# Patient Record
Sex: FEMALE | Race: WHITE | NOT HISPANIC OR LATINO | Employment: UNEMPLOYED | URBAN - METROPOLITAN AREA
[De-identification: names, ages, dates, MRNs, and addresses within clinical notes are randomized per-mention and may not be internally consistent; named-entity substitution may affect disease eponyms.]

---

## 2017-01-18 DIAGNOSIS — L70.0 ACNE VULGARIS: ICD-10-CM

## 2017-01-18 RX ORDER — MINOCYCLINE HYDROCHLORIDE 65 MG/1
1 TABLET, FILM COATED, EXTENDED RELEASE ORAL DAILY
Qty: 30 TABLET | Refills: 0 | Status: SHIPPED | OUTPATIENT
Start: 2017-01-18

## 2017-01-18 NOTE — TELEPHONE ENCOUNTER
Informed patient that Dr. Radford refilled x 1 month and does not intend to keep on systemic antibiotics more than four months.  Please ensure using contraception as this medication is not intended for use with pregnancy.

## 2017-01-18 NOTE — TELEPHONE ENCOUNTER
Pt last seen 11/4/2016 requesting refill on solodyn  Scheduled to come back 2/3/2017 please advise...    Acne vulgaris, moderate severe, jawline, scarring        - minocycline (SOLODYN) 65 mg Tb24; Take 1 tablet by mouth once daily. Dispense: 30 tablet; Refill: 2  - dapsone (ACZONE) 7.5 % GlwP; Apply 1 application topically once daily. Dispense: 90 g; Refill: 0  - Spironolactone 25mg DQ (nl renal function, discussed sources of potassium, avoid over consumption of banana, coconut water or salt free supplements),   -  BP wash daily in shower

## 2017-01-18 NOTE — TELEPHONE ENCOUNTER
solodyn refilled for one month. Do not intend to keep systemic antibiotics for longer than 4 months. Please call patient and ensure she is using contraception as this medication should not be used in pregnancy.

## 2017-02-03 ENCOUNTER — OFFICE VISIT (OUTPATIENT)
Dept: DERMATOLOGY | Facility: CLINIC | Age: 32
End: 2017-02-03
Payer: OTHER GOVERNMENT

## 2017-02-03 VITALS — WEIGHT: 135 LBS | BODY MASS INDEX: 19.99 KG/M2 | HEIGHT: 69 IN

## 2017-02-03 DIAGNOSIS — L70.0 ACNE VULGARIS: ICD-10-CM

## 2017-02-03 PROCEDURE — 99213 OFFICE O/P EST LOW 20 MIN: CPT | Mod: S$PBB,,, | Performed by: DERMATOLOGY

## 2017-02-03 PROCEDURE — 99212 OFFICE O/P EST SF 10 MIN: CPT | Mod: PBBFAC,PO | Performed by: DERMATOLOGY

## 2017-02-03 PROCEDURE — 99999 PR PBB SHADOW E&M-EST. PATIENT-LVL II: CPT | Mod: PBBFAC,,, | Performed by: DERMATOLOGY

## 2017-02-03 RX ORDER — TRETINOIN 0.25 MG/G
CREAM TOPICAL DAILY
COMMUNITY

## 2017-02-03 RX ORDER — SPIRONOLACTONE 50 MG/1
25 TABLET, FILM COATED ORAL DAILY
Qty: 30 TABLET | Refills: 2 | Status: SHIPPED | OUTPATIENT
Start: 2017-02-03 | End: 2018-02-03

## 2017-02-03 RX ORDER — SPIRONOLACTONE 50 MG/1
25 TABLET, FILM COATED ORAL DAILY
Qty: 30 TABLET | Refills: 2 | Status: SHIPPED | OUTPATIENT
Start: 2017-02-03 | End: 2017-02-03 | Stop reason: SDUPTHER

## 2017-02-03 NOTE — MR AVS SNAPSHOT
[facility]  [address]  [place] [address]  Phone: [phone]                  Lynne Hui   2/3/2017 9:30 AM   Office Visit    Description:  Female : 1985   Provider:  Kaitlin Radford MD   Department:  [facility]           Reason for Visit     Follow-up           Diagnoses this Visit        Comments    Acne vulgaris                To Do List           Future Appointments        Provider Department Dept Phone    2017 9:30 AM Kaitlin Radford MD [facility] [phone]      Goals (5 Years of Data)     None      Follow-Up and Disposition     Return in about 3 months (around 5/3/2017).    Follow-up and Disposition History       These Medications        Disp Refills Start End    tazarotene (TAZORAC) 0.05 % Crea cream 60 g 1 2/3/2017 2/3/2018    Apply topically every evening. - Topical    Pharmacy: [facility] - [address] Ph #: [phone]       spironolactone (ALDACTONE) 50 MG tablet 30 tablet 2 2/3/2017 2/3/2018    Take 0.5 tablets (25 mg total) by mouth once daily. - Oral    Pharmacy: [facility] - [address] Ph #: [phone]         [facility] On Call     [facility] On Call Nurse Care Line -  Assistance  Registered nurses in the [facility] On Call Center provide clinical advisement, health education, appointment booking, and other advisory services.  Call for this free service at [phone].             Medications           Message regarding Medications     Verify the changes and/or additions to your medication regime listed below are the same as discussed with your clinician today.  If any of these changes or additions are incorrect, please notify your healthcare provider.        START taking these NEW medications        Refills    tazarotene (TAZORAC) 0.05 % Crea cream 1    Sig: Apply topically every evening.    Class: Normal    Route: Topical     "       Verify that the below list of medications is an accurate representation of the medications you are currently taking.  If none reported, the list may be blank. If incorrect, please contact your healthcare provider. Carry this list with you in case of emergency.           Current Medications     tretinoin (RETIN-A) 0.025 % cream Apply topically once daily.    dapsone (ACZONE) 7.5 % GlwP Apply 1 application topically once daily.    minocycline (SOLODYN) 65 mg Tb24 Take 1 tablet by mouth once daily.    spironolactone (ALDACTONE) 50 MG tablet Take 0.5 tablets (25 mg total) by mouth once daily.    tazarotene (TAZORAC) 0.05 % Crea cream Apply topically every evening.           Clinical Reference Information           Your Vitals Were     Height Weight BMI          5' 9" (1.753 m) 61.2 kg (135 lb) 19.94 kg/m2        Allergies as of 2/3/2017     No Known Allergies      Immunizations Administered on Date of Encounter - 2/3/2017     None      Language Assistance Services     ATTENTION: Language assistance services are available, free of charge. Please call [phone].      ATENCIÓN: Si torey lomas, tiene a campos disposición servicios gratuitos de asistencia lingüística. Llame al [phone].     NAIMA Ý: N?u b?n nói Ti?ng Vi?t, có các d?ch v? h? tr? ngôn ng? mi?n phí dành cho b?n. G?i s? [phone].         [facility] complies with applicable Federal civil rights laws and does not discriminate on the basis of race, color, national origin, age, disability, or sex.        "

## 2017-02-03 NOTE — PROGRESS NOTES
Subjective:       Patient ID:  Lynne Hui is a 31 y.o. female who presents for   Chief Complaint   Patient presents with    Follow-up     acne     HPI Comments: Patient last seen 11/2016  Acne as a teen, treated with accutane, cleared skin, recurred in college, cleared on its own  During pregnancy, severe flare, improved between kids, then worsened again  Has been okay in reecnt past, moved from [place] one year ago and exploded on jawline, deep cysts  Tried dietary intervention and topicals    On murina, 2 children, no plan to conceive in near future  Breastfeeding at night    Started spironolactone 25mg QD, aczone 7.5 QAM, continued tretinoin 0.025% cream, solodyn x 3 mo  Noted improvement            Review of Systems   Constitutional: Negative for fever and chills.   Skin: Positive for activity-related sunscreen use. Negative for recent sunburn.        Objective:    Physical Exam   Constitutional: She appears well-developed and well-nourished. No distress.   Neurological: She is alert and oriented to person, place, and time. She is not disoriented.   Psychiatric: She has a normal mood and affect.   Skin:   Areas Examined (abnormalities noted in diagram):   Head / Face Inspection Performed  Neck Inspection Performed                   Diagram Legend     Erythematous scaling macule/papule c/w actinic keratosis       Vascular papule c/w angioma      Pigmented verrucoid papule/plaque c/w seborrheic keratosis      Yellow umbilicated papule c/w sebaceous hyperplasia      Irregularly shaped tan macule c/w lentigo     1-2 mm smooth white papules consistent with Milia      Movable subcutaneous cyst with punctum c/w epidermal inclusion cyst      Subcutaneous movable cyst c/w pilar cyst      Firm pink to brown papule c/w dermatofibroma      Pedunculated fleshy papule(s) c/w skin tag(s)      Evenly pigmented macule c/w junctional nevus     Mildly variegated pigmented, slightly irregular-bordered macule c/w mildly atypical  nevus      Flesh colored to evenly pigmented papule c/w intradermal nevus       Pink pearly papule/plaque c/w basal cell carcinoma      Erythematous hyperkeratotic cursted plaque c/w SCC      Surgical scar with no sign of skin cancer recurrence      Open and closed comedones      Inflammatory papules and pustules      Verrucoid papule consistent consistent with wart     Erythematous eczematous patches and plaques     Dystrophic onycholytic nail with subungual debris c/w onychomycosis     Umbilicated papule    Erythematous-base heme-crusted tan verrucoid plaque consistent with inflamed seborrheic keratosis     Erythematous Silvery Scaling Plaque c/w Psoriasis     See annotation      Assessment / Plan:        Acne vulgaris  Some improvement, increase aldactone  -     spironolactone (ALDACTONE) 50 MG tablet; Take 0.5 tablets (25 mg total) by mouth once daily.  Dispense: 30 tablet; Refill: 2  -     tazarotene (TAZORAC) 0.05 % Crea cream; Apply topically every evening.  Dispense: 60 g; Refill: 1  -  BP wash daily in shower      Still breastfeeding toddler at night, not accutane candidate  Prefers murina at this time for convenience, Krystle or ortho-tri-cyclen may be more acne-friendly contraceptive choice.  Still nursing 5yo daughter at night; planning to stop - will not start tazorac until discontinued    accutane as a teen; considering in future (once no longer BF)           Return in about 3 months (around 5/3/2017).

## 2017-03-09 ENCOUNTER — HISTORICAL (OUTPATIENT)
Dept: ADMINISTRATIVE | Facility: HOSPITAL | Age: 32
End: 2017-03-09

## 2017-03-09 LAB
ALBUMIN SERPL-MCNC: 4.5 G/DL (ref 3.1–4.7)
ALP SERPL-CCNC: 38 IU/L (ref 40–104)
ALT (SGPT): 19 IU/L (ref 3–33)
AST SERPL-CCNC: 22 IU/L (ref 10–40)
BACTERIA SPEC CULT: NORMAL
BASOPHILS NFR BLD: 0 K/UL (ref 0–0.2)
BASOPHILS NFR BLD: 0.7 %
BILIRUB SERPL-MCNC: 1.5 MG/DL (ref 0.3–1)
BUN SERPL-MCNC: 11 MG/DL (ref 8–20)
CALCIUM SERPL-MCNC: 9.2 MG/DL (ref 7.7–10.4)
CHLORIDE: 105 MMOL/L (ref 98–110)
CO2 SERPL-SCNC: 27 MMOL/L (ref 22.8–31.6)
CREATININE: 0.6 MG/DL (ref 0.6–1.4)
EOSINOPHIL NFR BLD: 0.1 K/UL (ref 0–0.7)
EOSINOPHIL NFR BLD: 2 %
ERYTHROCYTE [DISTWIDTH] IN BLOOD BY AUTOMATED COUNT: 13.5 % (ref 11.7–14.9)
GLUCOSE: 85 MG/DL (ref 70–99)
GRAN #: 1.6 K/UL (ref 1.4–6.5)
GRAN%: 40.5 %
HCT VFR BLD AUTO: 37.9 % (ref 36–48)
HGB BLD-MCNC: 12.3 G/DL (ref 12–15)
IMMATURE GRANS (ABS): 0 K/UL (ref 0–1)
IMMATURE GRANULOCYTES: 0.2 %
LYMPH #: 2 K/UL (ref 1.2–3.4)
LYMPH%: 48.4 %
MCH RBC QN AUTO: 31.1 PG (ref 25–35)
MCHC RBC AUTO-ENTMCNC: 32.5 G/DL (ref 31–36)
MCV RBC AUTO: 95.9 FL (ref 79–98)
MONO #: 0.3 K/UL (ref 0.1–0.6)
MONO%: 8.2 %
NUCLEATED RBCS: 0 %
PLATELET # BLD AUTO: 224 K/UL (ref 140–440)
PMV BLD AUTO: 11.1 FL (ref 8.8–12.7)
POTASSIUM SERPL-SCNC: 4.7 MMOL/L (ref 3.5–5)
PROT SERPL-MCNC: 6.8 G/DL (ref 6–8.2)
RBC # BLD AUTO: 3.95 M/UL (ref 3.5–5.5)
RBC # BLD AUTO: NORMAL /HPF
SODIUM: 139 MMOL/L (ref 134–144)
SQUAMOUS EPITHELIAL, UA: NORMAL
TSH SERPL DL<=0.005 MIU/L-ACNC: 1.06 ULU/ML (ref 0.3–5.6)
VITAMIN D, 1,25 (OH)2: 26.7 NG/ML (ref 30–100)
WBC # BLD AUTO: 4 K/UL (ref 5–10)
WBC # BLD: NORMAL /HPF

## 2017-07-21 ENCOUNTER — TELEPHONE (OUTPATIENT)
Dept: FAMILY MEDICINE | Facility: CLINIC | Age: 32
End: 2017-07-21

## 2017-07-21 DIAGNOSIS — L70.0 ACNE VULGARIS: Primary | ICD-10-CM

## 2017-07-21 NOTE — TELEPHONE ENCOUNTER
Pt. saw Dr. Radford (dermatology) but she keeps trying to push her on using Accutane. Pt. does not want to do that. She would like a 2nd opinion. Her ins. will cover Dr. Michael Weil. Can she get a referral for him?

## 2017-07-24 PROBLEM — R17 JAUNDICE: Status: ACTIVE | Noted: 2017-07-24

## 2017-07-24 PROBLEM — E55.9 UNSPECIFIED VITAMIN D DEFICIENCY: Status: ACTIVE | Noted: 2017-07-24

## 2017-07-24 PROBLEM — Z87.42 HISTORY OF ABNORMAL CERVICAL PAPANICOLAOU SMEAR: Status: ACTIVE | Noted: 2017-07-24

## 2017-07-24 PROBLEM — E78.5 HYPERLIPIDEMIA: Status: ACTIVE | Noted: 2017-07-24

## 2017-07-24 PROBLEM — L70.0 ACNE VULGARIS: Status: ACTIVE | Noted: 2017-07-24

## 2017-10-04 ENCOUNTER — OFFICE VISIT (OUTPATIENT)
Dept: FAMILY MEDICINE | Facility: CLINIC | Age: 32
End: 2017-10-04
Payer: OTHER GOVERNMENT

## 2017-10-04 VITALS
DIASTOLIC BLOOD PRESSURE: 72 MMHG | HEIGHT: 69 IN | HEART RATE: 64 BPM | RESPIRATION RATE: 16 BRPM | BODY MASS INDEX: 20.34 KG/M2 | WEIGHT: 137.31 LBS | OXYGEN SATURATION: 98 % | SYSTOLIC BLOOD PRESSURE: 112 MMHG

## 2017-10-04 DIAGNOSIS — N91.1 SECONDARY AMENORRHEA: ICD-10-CM

## 2017-10-04 DIAGNOSIS — L70.0 ACNE VULGARIS: ICD-10-CM

## 2017-10-04 DIAGNOSIS — Z87.42 HISTORY OF ABNORMAL CERVICAL PAPANICOLAOU SMEAR: Primary | ICD-10-CM

## 2017-10-04 DIAGNOSIS — E55.9 VITAMIN D DEFICIENCY: ICD-10-CM

## 2017-10-04 PROCEDURE — 99214 OFFICE O/P EST MOD 30 MIN: CPT | Mod: ,,, | Performed by: INTERNAL MEDICINE

## 2017-10-04 RX ORDER — VIT C/E/ZN/COPPR/LUTEIN/ZEAXAN 250MG-90MG
1000 CAPSULE ORAL DAILY
Qty: 30 CAPSULE | Refills: 11 | Status: SHIPPED | OUTPATIENT
Start: 2017-10-04

## 2017-10-04 NOTE — PATIENT INSTRUCTIONS
Cholesterol  Does this test have other names?  Total blood cholesterol, serum cholesterol  What is this test?  This test measures the amount of cholesterol in your blood. This helps your healthcare provider figure out your risk for heart disease.  Cholesterol is a substance found in all of your body's cells, where it plays an important role. But your body can have too much cholesterol if you eat the wrong types of foods. These include fried foods and foods with saturated or trans fats. Some health conditions can also make your cholesterol level too high.  If you have too much cholesterol in your blood, it can stick to the walls of the arteries in your heart. This can cause heart disease. Cholesterol can also stick to the walls of arteries elsewhere in your body. This can cause other artery diseases. The extra cholesterol can make your blood vessels narrower (atherosclerosis). This narrowing makes it harder to get enough blood through your blood vessels. If your heart muscles don't get enough blood, you may be at risk for a heart attack.  The American Heart Association recommends that all adults ages 20 and older have their cholesterol checked every 4 to 6 years.  Why do I need this test?  You may have this test as part of your regular health checkup. You may have this test done more often if you are at risk for heart disease or have other health problems caused by high cholesterol.  Here are some common reasons for the test:  · You have risk factors for heart disease. These include older age, obesity, family history of heart disease, high blood pressure, smoking, and diabetes.  · You have a condition that may be closely linked to high cholesterol. This includes diabetes, alcoholism, and thyroid, liver, or kidney disease.  · You eat a diet high in cholesterol and fats.  You may also have this test if you had high or borderline cholesterol on an earlier blood test. Your healthcare provider may want to check to see  "whether medicine, diet, exercise, and other lifestyle changes are helping lower your cholesterol.  What other tests might I have along with this test?  Your healthcare provider may also order other blood tests to find out your HDL ("good") cholesterol, LDL ("bad") cholesterol, and triglyceride levels. This combination of blood tests is called a lipoprotein profile or a lipid profile.  What do my test results mean?  Many things may affect your lab test results. These include the method each lab uses to do the test. Even if your test results are different from the normal value, you may not have a problem. To learn what the results mean for you, talk with your healthcare provider.  Total cholesterol is measured in milligrams per deciliter (mg/dL). This is what your cholesterol number may mean:  · Less than 200 mg/dL is a good number. Your risk of heart disease may be low.  · 200 mg/dL to 239 mg/dL is borderline high. You may be at some risk for heart disease.  · 240 mg/dL or higher means your cholesterol is high. Your risk for heart disease may be higher than that of a person with normal cholesterol.  Keep in mind that your risk for heart disease based on your total cholesterol greatly depends on your HDL and LDL cholesterol levels and other risk factors.  High cholesterol may be linked to these conditions:  · Inherited diseases that cause high cholesterol  · Gallbladder stones  · Kidney failure  · Cancer of the pancreas or prostate  · Low thyroid hormone  · Diabetes  · Obesity  Cholesterol levels below 140 mg/dL may happen with:  · Very healthy lifestyle and diet  · Severe liver disease  · High thyroid hormone  · Severe burns  · White blood cell cancers  · Poor nutrition  · Some types of anemia  · Short-term illness or infection  · Chronic lung disease  How is this test done?  The test requires a blood sample, which is drawn through a needle from a vein in your arm.  Does this test pose any risks?  Taking a blood " sample with a needle carries risks that include bleeding, infection, bruising, or feeling dizzy. When the needle pricks your arm, you may feel a slight stinging sensation or pain. Afterward, the site may be slightly sore.  What might affect my test results?  Your diet, age, alcohol use, and other lifestyle choices may affect your results. Many medicines may also affect your results. Pregnancy may also affect your results. Having a heart attack in the last 3 months will also affect your results.  How do I get ready for this test?  You should keep to your regular diet and avoid alcohol for at least 2 days before this test. You will be asked to not eat or drink anything but water for a certain amount of time before the test. This test is usually done in the morning after you fast overnight. If you take medicines in the morning, ask your healthcare provider whether you should take your pills.  In addition, be sure your healthcare provider knows about all medicines, herbs, vitamins, and supplements you are taking. This includes medicines that don't need a prescription and any illicit drugs you may use.  © 7598-3153 [employer]. [address] [place] [address], [place] [address]. All rights reserved. This information is not intended as a substitute for professional medical care. Always follow your healthcare professional's instructions.

## 2017-10-04 NOTE — PROGRESS NOTES
Subjective:       Patient ID: Lynne Hui is a 32 y.o. female.    Chief Complaint: Establish Care (dr thompson pt; est care & needs referrals to all dr's as dr thompson's not accepted w/insurance now that she is no longer w/phy network here)    Patient is here for 2 referrals once to OB/GYN and the other to dermatology. Almost 4 years ago she had the Mirena placed and not does not have any periods. She did go see her OB/GYN last week because her breast been tender and she is palpating small tender lumps. This is been going on for a few weeks. She also requested getting the Mirena out before she leaves for [place] with her  was in the . When her OB/GYN did a Pap smear Mirena string was not present. She did order a mammogram and the patient is concerned that she could be pregnant, like a urine pregnancy test before she goes and gets her mammogram.    She also has significant acne needs referral to go see her dermatologist again.    Back in March she did have blood work and her lipid profile showed her LDL to be high but her HDL was also high. Her total cholesterol to good cholesterol ratio 3.0.    Her vitamin D level is 24 within normal range being . She did not supplement with vitamin D and she thought it was, by mail from the mail pharmacy the [facility] uses.      Past Medical History:   Diagnosis Date    Hyperlipidemia        Past Surgical History:   Procedure Laterality Date    BREAST SURGERY      HIP SURGERY         Family History   Problem Relation Age of Onset    No Known Problems Mother     No Known Problems Father     Melanoma Neg Hx     Lupus Neg Hx     Eczema Neg Hx     Psoriasis Neg Hx        Social History     Social History    Marital status:      Spouse name: N/A    Number of children: N/A    Years of education: N/A     Social History Main Topics    Smoking status: Never Smoker    Smokeless tobacco: Never Used    Alcohol use No    Drug use: No    Sexual activity: Yes      Partners: Male     Other Topics Concern    None     Social History Narrative    None       Current Outpatient Prescriptions   Medication Sig Dispense Refill    dapsone (ACZONE) 7.5 % GlwP Apply 1 application topically once daily. 90 g 0    tretinoin (RETIN-A) 0.025 % cream Apply topically once daily.      cholecalciferol, vitamin D3, 1,000 unit capsule Take 1 capsule (1,000 Units total) by mouth once daily. 30 capsule 11    minocycline (SOLODYN) 65 mg Tb24 Take 1 tablet by mouth once daily. 30 tablet 0    spironolactone (ALDACTONE) 50 MG tablet Take 0.5 tablets (25 mg total) by mouth once daily. 30 tablet 2    tazarotene (TAZORAC) 0.05 % Crea cream Apply topically every evening. 60 g 1     No current facility-administered medications for this visit.        Review of patient's allergies indicates:  No Known Allergies  Review of Systems   Constitutional: Negative for activity change, appetite change, chills, diaphoresis, fatigue, fever and unexpected weight change.   HENT: Negative for congestion, ear pain, mouth sores, postnasal drip, sinus pressure, sore throat and trouble swallowing.    Eyes: Negative for pain, redness and visual disturbance.   Respiratory: Negative for apnea, cough, chest tightness, shortness of breath and wheezing.    Cardiovascular: Negative for chest pain, palpitations and leg swelling.   Gastrointestinal: Negative for abdominal distention, abdominal pain, blood in stool, constipation, diarrhea, nausea and vomiting.   Endocrine: Negative for cold intolerance, polydipsia, polyphagia and polyuria.   Genitourinary: Negative for difficulty urinating, dysuria, flank pain, frequency, hematuria, menstrual problem, pelvic pain and urgency.   Musculoskeletal: Negative for arthralgias, back pain, joint swelling and neck pain.   Skin: Negative for color change, rash and wound.        Breast complaints - as per history of present illness.   Neurological: Negative for dizziness, tremors, seizures,  syncope, weakness, light-headedness, numbness and headaches.   Hematological: Negative for adenopathy. Does not bruise/bleed easily.   Psychiatric/Behavioral: Negative for confusion, decreased concentration, dysphoric mood, hallucinations, self-injury, sleep disturbance and suicidal ideas. The patient is not nervous/anxious.        Objective:      Physical Exam   Constitutional: She is oriented to person, place, and time. She appears well-developed and well-nourished.   HENT:   Head: Normocephalic and atraumatic.   Eyes: EOM are normal. Pupils are equal, round, and reactive to light.   Neck: Neck supple. No JVD present.   Cardiovascular: Normal rate and regular rhythm.  Exam reveals no gallop and no friction rub.    No murmur heard.  Pulmonary/Chest: Effort normal and breath sounds normal. She has no wheezes. She has no rales.   Abdominal: Soft. Bowel sounds are normal. She exhibits no distension. There is no tenderness.   Musculoskeletal: She exhibits no edema or tenderness.   Lymphadenopathy:     She has no cervical adenopathy.   Neurological: She is alert and oriented to person, place, and time. She has normal reflexes. No cranial nerve deficit.   Skin: No rash noted. No erythema.   Psychiatric: She has a normal mood and affect.   Nursing note and vitals reviewed.      Lab Results   Component Value Date    WBC 4.0 (L) 03/09/2017    WBC NONE SEEN 03/09/2017    HGB 12.3 03/09/2017    HCT 37.9 03/09/2017     03/09/2017    AST 22 03/09/2017     03/09/2017    K 4.7 03/09/2017     03/09/2017    CREATININE 0.60 03/09/2017    BUN 11 03/09/2017    CO2 27.0 03/09/2017    TSH 1.06 03/09/2017     Assessment:       1. History of abnormal cervical Papanicolaou smear    2. Secondary amenorrhea    3. Acne vulgaris    4. Vitamin D deficiency        Plan:       History of abnormal cervical Papanicolaou smear  -     Ambulatory referral to Obstetrics / Gynecology    Secondary amenorrhea  -     POCT Urine  Pregnancy  -     Ambulatory referral to Obstetrics / Gynecology    Acne vulgaris  -     Ambulatory referral to Dermatology    Vitamin D deficiency  -     cholecalciferol, vitamin D3, 1,000 unit capsule; Take 1 capsule (1,000 Units total) by mouth once daily.  Dispense: 30 capsule; Refill: 11

## 2017-10-05 ENCOUNTER — TELEPHONE (OUTPATIENT)
Dept: FAMILY MEDICINE | Facility: CLINIC | Age: 32
End: 2017-10-05

## 2017-10-05 DIAGNOSIS — Z30.432 ENCOUNTER FOR REMOVAL OF INTRAUTERINE CONTRACEPTIVE DEVICE: Primary | ICD-10-CM

## 2017-10-05 NOTE — TELEPHONE ENCOUNTER
Pt called. Requested you resend Referral to Dr Jensen, with a specific DX code they need to remove her IUD:    Code # Z 30.432    They cannot remove IUD & bill without this code.

## 2017-10-10 ENCOUNTER — TELEPHONE (OUTPATIENT)
Dept: FAMILY MEDICINE | Facility: CLINIC | Age: 32
End: 2017-10-10

## 2017-10-10 NOTE — TELEPHONE ENCOUNTER
Attempted to call pt to inform referral with proper DX code for IUD removal was resubmitted. No answer, LVM

## 2017-11-10 ENCOUNTER — TELEPHONE (OUTPATIENT)
Dept: FAMILY MEDICINE | Facility: CLINIC | Age: 32
End: 2017-11-10

## 2017-11-10 DIAGNOSIS — L70.0 ACNE VULGARIS: Primary | ICD-10-CM

## 2017-11-10 NOTE — TELEPHONE ENCOUNTER
Pt called yesterday. States she is on 4 out of 4 visits on the referral to the dermatologist that you wrote. Can she please have another referral for 4 more visits with the dermatologist or does she have to make an appt with you first?    The referral was to Dr Weil @ [facility].

## 2017-11-16 ENCOUNTER — CLINICAL SUPPORT (OUTPATIENT)
Dept: FAMILY MEDICINE | Facility: CLINIC | Age: 32
End: 2017-11-16
Payer: OTHER GOVERNMENT

## 2017-11-16 DIAGNOSIS — Z23 INFLUENZA VACCINE ADMINISTERED: Primary | ICD-10-CM

## 2017-11-16 PROCEDURE — 90471 IMMUNIZATION ADMIN: CPT | Mod: ,,, | Performed by: INTERNAL MEDICINE

## 2017-11-16 PROCEDURE — 90686 IIV4 VACC NO PRSV 0.5 ML IM: CPT | Mod: ,,, | Performed by: INTERNAL MEDICINE

## 2018-01-02 ENCOUNTER — TELEPHONE (OUTPATIENT)
Dept: FAMILY MEDICINE | Facility: CLINIC | Age: 33
End: 2018-01-02

## 2018-01-02 DIAGNOSIS — L70.0 ACNE VULGARIS: Primary | ICD-10-CM

## 2018-01-02 NOTE — TELEPHONE ENCOUNTER
Pt called and Dr Weil needs you to put in more referrals in for the pt. May she please have 4-6 more referral visits ordered. thx

## 2018-02-12 ENCOUNTER — TELEPHONE (OUTPATIENT)
Dept: FAMILY MEDICINE | Facility: CLINIC | Age: 33
End: 2018-02-12

## 2018-02-12 DIAGNOSIS — J30.9 CHRONIC ALLERGIC RHINITIS, UNSPECIFIED SEASONALITY, UNSPECIFIED TRIGGER: Primary | ICD-10-CM

## 2018-02-12 NOTE — TELEPHONE ENCOUNTER
----- Message from Neeru Peoples LPN sent at 2/12/2018  1:12 PM CST -----  Pt stopped by office needing a referral to Dr. Trisha Bucio for allergy testing. Please contact patient once this request is approved by Dr. Bauman

## 2018-02-23 ENCOUNTER — OFFICE VISIT (OUTPATIENT)
Dept: ALLERGY | Facility: CLINIC | Age: 33
End: 2018-02-23
Payer: OTHER GOVERNMENT

## 2018-02-23 VITALS
DIASTOLIC BLOOD PRESSURE: 68 MMHG | SYSTOLIC BLOOD PRESSURE: 108 MMHG | WEIGHT: 134.81 LBS | HEIGHT: 70 IN | BODY MASS INDEX: 19.3 KG/M2

## 2018-02-23 DIAGNOSIS — J30.2 ACUTE SEASONAL ALLERGIC RHINITIS DUE TO OTHER ALLERGEN: Primary | ICD-10-CM

## 2018-02-23 DIAGNOSIS — J30.1 ACUTE SEASONAL ALLERGIC RHINITIS DUE TO POLLEN: ICD-10-CM

## 2018-02-23 PROCEDURE — 95004 PERQ TESTS W/ALRGNC XTRCS: CPT | Mod: ,,, | Performed by: ALLERGY & IMMUNOLOGY

## 2018-02-23 PROCEDURE — 99203 OFFICE O/P NEW LOW 30 MIN: CPT | Mod: 25,,, | Performed by: ALLERGY & IMMUNOLOGY

## 2018-02-23 PROCEDURE — 3008F BODY MASS INDEX DOCD: CPT | Mod: ,,, | Performed by: ALLERGY & IMMUNOLOGY

## 2018-02-23 NOTE — PROGRESS NOTES
"Subjective:       Patient ID: Lynne Hui is a 32 y.o. female.    Chief Complaint: Allergy Testing (Inhalants & Foods )    HPI     Pt presents as a new patient for allergy evalation.     She has never been tested prior.   She will be moving to [place] at the end of this month and would like to be tested to inhalants and foods to make sure she avoids these things in the future.   She has rhinitis symptoms seasonally.   She denies any abdominal pain or reflux currently, however, both of her children have food allergy.       Review of Systems      General: neg unexpected weight changes, fevers, chills, night sweats, malaise  HEENT: see hpi, Neg eye pain, vision changes, ear drainage, nose bleeds, throat tightness, sores in the mouth  CV: Neg chest pain, palpitations, swelling  Resp: see hpi, neg shortness of breath, hemoptysis, cough  GI: see hpi, neg dysphagia, night abdominal pain, reflux, chronic diarrhea, chronic constipation  Derm: See Hpi, neg new rash, neg flushing  Mu/sk: Neg joint pain, joint swelling   Psych: Neg anxiety  neuro: neg chronic headaches, muscle weakness  Endo: neg heat/cold intolerance, chronic fatigue    Objective:       Vitals:    02/23/18 1014   BP: 108/68   Weight: 61.1 kg (134 lb 12.8 oz)   Height: 5' 10" (1.778 m)       Physical Exam      General: no acute distress, well developed well nourished       Assessment:       1. Acute seasonal allergic rhinitis due to other allergen    2. Acute seasonal allergic rhinitis due to pollen        Plan:       Acute seasonal allergic rhinitis due to other allergen    Acute seasonal allergic rhinitis due to pollen        discussed results of skin prick testing with patient.   She will be in [place] by march 1st.     Skin Prick testing to Inhalants was performed today with 45 inhalents applied to the upper, mid, and lower back. Adequate histamine and saline  controls. Pertinent Positives: dust mite, grasses, few weeds, few trees, and few mold.     quintip " device was used for food allergens made by [employer]. Allergens were placed on the volar surface of the forearm.  Adequate histamine and saline control was used with michele tip.  Pertinent positives: mild sensitivities to shellfish , fish, and egg.

## 2018-02-26 ENCOUNTER — TELEPHONE (OUTPATIENT)
Dept: FAMILY MEDICINE | Facility: CLINIC | Age: 33
End: 2018-02-26

## 2018-02-26 NOTE — TELEPHONE ENCOUNTER
Pt called and states she wanted to get IUD removed before Wed this week but Dr Boateng has no openings. Is there any other Dr that could remove before WED? Please advise
